# Patient Record
Sex: FEMALE | Race: WHITE | NOT HISPANIC OR LATINO | Employment: FULL TIME | URBAN - METROPOLITAN AREA
[De-identification: names, ages, dates, MRNs, and addresses within clinical notes are randomized per-mention and may not be internally consistent; named-entity substitution may affect disease eponyms.]

---

## 2017-07-08 ENCOUNTER — TELEPHONE (OUTPATIENT)
Dept: URGENT CARE | Facility: CLINIC | Age: 32
End: 2017-07-08

## 2021-04-14 ENCOUNTER — OFFICE VISIT (OUTPATIENT)
Dept: INTERNAL MEDICINE | Facility: CLINIC | Age: 36
End: 2021-04-14

## 2021-04-14 ENCOUNTER — LAB (OUTPATIENT)
Dept: LAB | Facility: HOSPITAL | Age: 36
End: 2021-04-14

## 2021-04-14 VITALS
OXYGEN SATURATION: 99 % | SYSTOLIC BLOOD PRESSURE: 132 MMHG | HEIGHT: 65 IN | WEIGHT: 131.6 LBS | HEART RATE: 84 BPM | TEMPERATURE: 98 F | DIASTOLIC BLOOD PRESSURE: 62 MMHG | BODY MASS INDEX: 21.92 KG/M2 | RESPIRATION RATE: 16 BRPM

## 2021-04-14 DIAGNOSIS — Z00.00 HEALTHCARE MAINTENANCE: ICD-10-CM

## 2021-04-14 DIAGNOSIS — Z13.29 SCREENING FOR THYROID DISORDER: ICD-10-CM

## 2021-04-14 DIAGNOSIS — Z13.21 ENCOUNTER FOR VITAMIN DEFICIENCY SCREENING: ICD-10-CM

## 2021-04-14 DIAGNOSIS — Z87.898 HISTORY OF SUBSTANCE USE DISORDER: ICD-10-CM

## 2021-04-14 DIAGNOSIS — Z13.220 SCREENING, LIPID: ICD-10-CM

## 2021-04-14 DIAGNOSIS — F41.9 ANXIETY: Primary | ICD-10-CM

## 2021-04-14 DIAGNOSIS — G43.109 COMPLICATED MIGRAINE: ICD-10-CM

## 2021-04-14 LAB
25(OH)D3 SERPL-MCNC: 31.6 NG/ML
ALBUMIN SERPL-MCNC: 4.9 G/DL (ref 3.5–5.2)
ALBUMIN/GLOB SERPL: 2 G/DL
ALP SERPL-CCNC: 49 U/L (ref 39–117)
ALT SERPL W P-5'-P-CCNC: 22 U/L (ref 1–33)
ANION GAP SERPL CALCULATED.3IONS-SCNC: 9.2 MMOL/L (ref 5–15)
AST SERPL-CCNC: 11 U/L (ref 1–32)
BILIRUB SERPL-MCNC: 0.6 MG/DL (ref 0–1.2)
BUN SERPL-MCNC: 11 MG/DL (ref 6–20)
BUN/CREAT SERPL: 14.5 (ref 7–25)
CALCIUM SPEC-SCNC: 9.6 MG/DL (ref 8.6–10.5)
CHLORIDE SERPL-SCNC: 103 MMOL/L (ref 98–107)
CHOLEST SERPL-MCNC: 155 MG/DL (ref 0–200)
CO2 SERPL-SCNC: 28.8 MMOL/L (ref 22–29)
CREAT SERPL-MCNC: 0.76 MG/DL (ref 0.57–1)
DEPRECATED RDW RBC AUTO: 43.2 FL (ref 37–54)
ERYTHROCYTE [DISTWIDTH] IN BLOOD BY AUTOMATED COUNT: 11.7 % (ref 12.3–15.4)
GFR SERPL CREATININE-BSD FRML MDRD: 87 ML/MIN/1.73
GLOBULIN UR ELPH-MCNC: 2.5 GM/DL
GLUCOSE SERPL-MCNC: 72 MG/DL (ref 65–99)
HCT VFR BLD AUTO: 44.4 % (ref 34–46.6)
HDLC SERPL-MCNC: 61 MG/DL (ref 40–60)
HGB BLD-MCNC: 14.5 G/DL (ref 12–15.9)
LDLC SERPL CALC-MCNC: 76 MG/DL (ref 0–100)
LDLC/HDLC SERPL: 1.22 {RATIO}
MCH RBC QN AUTO: 32.9 PG (ref 26.6–33)
MCHC RBC AUTO-ENTMCNC: 32.7 G/DL (ref 31.5–35.7)
MCV RBC AUTO: 100.7 FL (ref 79–97)
PLATELET # BLD AUTO: 384 10*3/MM3 (ref 140–450)
PMV BLD AUTO: 10.9 FL (ref 6–12)
POTASSIUM SERPL-SCNC: 4.5 MMOL/L (ref 3.5–5.2)
PROT SERPL-MCNC: 7.4 G/DL (ref 6–8.5)
RBC # BLD AUTO: 4.41 10*6/MM3 (ref 3.77–5.28)
SODIUM SERPL-SCNC: 141 MMOL/L (ref 136–145)
TRIGL SERPL-MCNC: 97 MG/DL (ref 0–150)
TSH SERPL DL<=0.05 MIU/L-ACNC: 0.94 UIU/ML (ref 0.27–4.2)
VLDLC SERPL-MCNC: 18 MG/DL (ref 5–40)
WBC # BLD AUTO: 6.25 10*3/MM3 (ref 3.4–10.8)

## 2021-04-14 PROCEDURE — 99204 OFFICE O/P NEW MOD 45 MIN: CPT | Performed by: INTERNAL MEDICINE

## 2021-04-14 PROCEDURE — 82306 VITAMIN D 25 HYDROXY: CPT

## 2021-04-14 PROCEDURE — 80061 LIPID PANEL: CPT

## 2021-04-14 PROCEDURE — 85027 COMPLETE CBC AUTOMATED: CPT

## 2021-04-14 PROCEDURE — 84443 ASSAY THYROID STIM HORMONE: CPT

## 2021-04-14 PROCEDURE — 80053 COMPREHEN METABOLIC PANEL: CPT

## 2021-04-14 RX ORDER — HYDROXYZINE HYDROCHLORIDE 10 MG/1
10 TABLET, FILM COATED ORAL 3 TIMES DAILY PRN
COMMUNITY

## 2021-04-14 RX ORDER — BUSPIRONE HYDROCHLORIDE 5 MG/1
5 TABLET ORAL 3 TIMES DAILY
COMMUNITY

## 2021-04-14 NOTE — PROGRESS NOTES
"Internal Medicine New Patient  Dee Dee Ahn is a 35 y.o. female who presents today to establish care and with concerns as outlined below.    Chief Complaint  Chief Complaint   Patient presents with   • Establish Care     New Pt        HPI  Ms. Ahn comes in today to establish care. She has not had a PCP for a few years, last saw [facility]. She is seeing [facility] for anxiety and depression. She is not seeing a counselor. She has been prescribed buspar 5mg TID and hydroxyzine 10mg TID PRN but she notes that she does not like taking medication so has not started taking it. She feels that if she gets her anxiety under control she may be able to also manage her depression. She has a history of complicated migraine with loss of sensation on half of her body, vision changes, and \"out of body sensation.\" She came off of OCP when complicated migraines started to occur. She notes these occur rarely and has deferred medications. She has a past hx of DILLON with primary drug of abuse being oxycontin. She notes sobriety x13y. She moved to [place] from [place] to get a fresh start and find a community to support sobriety. She reports her fear of medications stems from her past drug abuse.       Review of Systems  Review of Systems   Constitutional: Negative.    Eyes: Negative.    Respiratory: Negative.    Cardiovascular: Negative.    Gastrointestinal: Positive for diarrhea (occasional with increased anxiety). Negative for abdominal pain, anal bleeding, blood in stool, nausea, vomiting, GERD and indigestion.   Genitourinary: Positive for menstrual problem (PMS).   Musculoskeletal: Negative.    Skin: Negative.    Neurological: Positive for headache (infrequent migraine).   Psychiatric/Behavioral: Positive for dysphoric mood and sleep disturbance. Negative for depressed mood. The patient is nervous/anxious.         Past Medical History  Past Medical History:   Diagnosis Date   • Anxiety    • Depression  " "  • Migraine         Surgical History  History reviewed. No pertinent surgical history.     Family History  Family History   Problem Relation Age of Onset   • Cancer Father    • Heart attack Father    • Depression Maternal Grandmother    • Depression Maternal Grandfather    • Depression Paternal Grandmother    • Depression Paternal Grandfather         Social History  Social History     Socioeconomic History   • Marital status: Unknown     Spouse name: Not on file   • Number of children: Not on file   • Years of education: Not on file   • Highest education level: Not on file   Tobacco Use   • Smoking status: Never Smoker   • Smokeless tobacco: Never Used   • Tobacco comment: Nicoret    Vaping Use   • Vaping Use: Never used   Substance and Sexual Activity   • Alcohol use: No     Comment: Recovery 12 years   • Drug use: No     Comment: recovering addict   • Sexual activity: Defer        Current Medications  Current Outpatient Medications on File Prior to Visit   Medication Sig Dispense Refill   • busPIRone (BUSPAR) 5 MG tablet Take 5 mg by mouth 3 (Three) Times a Day.       No current facility-administered medications on file prior to visit.       Allergies  Allergies   Allergen Reactions   • Eggs Or Egg-Derived Products GI Intolerance        Objective  Visit Vitals  /62   Pulse 84   Temp 98 °F (36.7 °C)   Resp 16   Ht 165 cm (64.96\")   Wt 59.7 kg (131 lb 9.6 oz)   SpO2 99%   BMI 21.93 kg/m²        Physical Exam  Physical Exam  Vitals and nursing note reviewed.   Constitutional:       General: She is not in acute distress.     Appearance: Normal appearance. She is well-developed and normal weight. She is not ill-appearing or diaphoretic.   HENT:      Head: Normocephalic and atraumatic.      Right Ear: Tympanic membrane, ear canal and external ear normal.      Left Ear: Tympanic membrane, ear canal and external ear normal.      Nose: Nose normal.   Eyes:      General: No scleral icterus.     Extraocular Movements: " Extraocular movements intact.      Conjunctiva/sclera: Conjunctivae normal.   Cardiovascular:      Rate and Rhythm: Normal rate and regular rhythm.      Heart sounds: Normal heart sounds. No murmur heard.     Pulmonary:      Effort: Pulmonary effort is normal. No respiratory distress.      Breath sounds: Normal breath sounds.   Abdominal:      General: Bowel sounds are normal. There is no distension.      Palpations: Abdomen is soft. There is no mass.      Tenderness: There is no abdominal tenderness.   Musculoskeletal:         General: No deformity.      Cervical back: Neck supple.      Right lower leg: No edema.      Left lower leg: No edema.   Skin:     General: Skin is warm and dry.      Findings: No rash.   Neurological:      General: No focal deficit present.      Mental Status: She is alert and oriented to person, place, and time.      Gait: Gait normal.   Psychiatric:         Mood and Affect: Mood is anxious.         Behavior: Behavior normal.         Thought Content: Thought content normal.         Judgment: Judgment normal.          Results  No results associated with this encounter.     Assessment and Plan  Diagnoses and all orders for this visit:    Anxiety  - Uncontrolled. Following with [facility].  - Prescribed buspar 5mg TID and hydroxyzine 10mg TID PRN but has not taken either. Discussed that neither is habit forming or addicting and discussed potential side effects of buspar. Encouraged her to try taking buspar and continue following with psychiatrist.    Complicated migraine  - Hx of migraine associated with hemisensory loss, vision changes.  - Infrequent and declines medications which is reasonable given infrequent occurrence.  - Given hx of complicated migraine discussed that a combined OCP would be contraindicated and recommended GYN to discuss alternative.    History of substance use disorder  - Sober x13y, previous opiate abuse.    Healthcare maintenance  - CBC and CMP  ordered    Screening for thyroid disorder  - TSH ordered    Screening, lipid  - Lipid panel ordered    Encounter for vitamin deficiency screening  - Vitamin D level ordered      Health Maintenance   Topic Date Due   • ANNUAL PHYSICAL  Never done   • COVID-19 Vaccine (1) Never done   • TDAP/TD VACCINES (1 - Tdap) Never done   • HEPATITIS C SCREENING  Never done   • PAP SMEAR  Never done   • INFLUENZA VACCINE  08/01/2021   • Pneumococcal Vaccine 0-64  Aged Out     Health Maintenance  - Pap smear: GYN [facility], has not been seen recently but will schedule. Declines referral.  - Mammogram: Start screening at age 40.  - Colonoscopy: Start screening at age 45.  - HCV: negative  - Immunizations: Needs COVID, tdap.  - Depression screening: negative 4/2021    Return in about 1 year (around 4/14/2022) for Annual, Labs today.

## 2023-04-19 ENCOUNTER — OFFICE VISIT (OUTPATIENT)
Dept: FAMILY MEDICINE CLINIC | Facility: CLINIC | Age: 38
End: 2023-04-19
Payer: COMMERCIAL

## 2023-04-19 ENCOUNTER — LAB (OUTPATIENT)
Dept: LAB | Facility: HOSPITAL | Age: 38
End: 2023-04-19
Payer: COMMERCIAL

## 2023-04-19 VITALS
HEIGHT: 67 IN | OXYGEN SATURATION: 98 % | SYSTOLIC BLOOD PRESSURE: 108 MMHG | BODY MASS INDEX: 23.2 KG/M2 | HEART RATE: 88 BPM | WEIGHT: 147.8 LBS | TEMPERATURE: 97.8 F | DIASTOLIC BLOOD PRESSURE: 62 MMHG

## 2023-04-19 DIAGNOSIS — Z13.29 SCREENING FOR ENDOCRINE DISORDER: ICD-10-CM

## 2023-04-19 DIAGNOSIS — E07.89 THYROID FULLNESS: ICD-10-CM

## 2023-04-19 DIAGNOSIS — Z13.0 SCREENING FOR DEFICIENCY ANEMIA: ICD-10-CM

## 2023-04-19 DIAGNOSIS — F41.9 ANXIETY: Primary | ICD-10-CM

## 2023-04-19 DIAGNOSIS — Z13.220 SCREENING FOR HYPERLIPIDEMIA: ICD-10-CM

## 2023-04-19 LAB
ALBUMIN SERPL-MCNC: 5.1 G/DL (ref 3.5–5.2)
ALBUMIN/GLOB SERPL: 2.1 G/DL
ALP SERPL-CCNC: 60 U/L (ref 39–117)
ALT SERPL W P-5'-P-CCNC: 14 U/L (ref 1–33)
ANION GAP SERPL CALCULATED.3IONS-SCNC: 11.6 MMOL/L (ref 5–15)
AST SERPL-CCNC: 9 U/L (ref 1–32)
BACTERIA UR QL AUTO: ABNORMAL /HPF
BILIRUB SERPL-MCNC: 0.6 MG/DL (ref 0–1.2)
BILIRUB UR QL STRIP: NEGATIVE
BUN SERPL-MCNC: 7 MG/DL (ref 6–20)
BUN/CREAT SERPL: 8 (ref 7–25)
CALCIUM SPEC-SCNC: 9.7 MG/DL (ref 8.6–10.5)
CHLORIDE SERPL-SCNC: 102 MMOL/L (ref 98–107)
CHOLEST SERPL-MCNC: 160 MG/DL (ref 0–200)
CLARITY UR: CLEAR
CO2 SERPL-SCNC: 25.4 MMOL/L (ref 22–29)
COLOR UR: YELLOW
CREAT SERPL-MCNC: 0.87 MG/DL (ref 0.57–1)
DEPRECATED RDW RBC AUTO: 40.2 FL (ref 37–54)
EGFRCR SERPLBLD CKD-EPI 2021: 88.1 ML/MIN/1.73
ERYTHROCYTE [DISTWIDTH] IN BLOOD BY AUTOMATED COUNT: 11.4 % (ref 12.3–15.4)
GLOBULIN UR ELPH-MCNC: 2.4 GM/DL
GLUCOSE SERPL-MCNC: 91 MG/DL (ref 65–99)
GLUCOSE UR STRIP-MCNC: NEGATIVE MG/DL
HBA1C MFR BLD: 5.3 % (ref 4.8–5.6)
HCT VFR BLD AUTO: 43.3 % (ref 34–46.6)
HDLC SERPL-MCNC: 58 MG/DL (ref 40–60)
HGB BLD-MCNC: 14.8 G/DL (ref 12–15.9)
HGB UR QL STRIP.AUTO: NEGATIVE
HYALINE CASTS UR QL AUTO: ABNORMAL /LPF
KETONES UR QL STRIP: ABNORMAL
LDLC SERPL CALC-MCNC: 91 MG/DL (ref 0–100)
LDLC/HDLC SERPL: 1.58 {RATIO}
LEUKOCYTE ESTERASE UR QL STRIP.AUTO: ABNORMAL
MCH RBC QN AUTO: 32.9 PG (ref 26.6–33)
MCHC RBC AUTO-ENTMCNC: 34.2 G/DL (ref 31.5–35.7)
MCV RBC AUTO: 96.2 FL (ref 79–97)
NITRITE UR QL STRIP: NEGATIVE
PH UR STRIP.AUTO: 6 [PH] (ref 5–8)
PLATELET # BLD AUTO: 406 10*3/MM3 (ref 140–450)
PMV BLD AUTO: 10.4 FL (ref 6–12)
POTASSIUM SERPL-SCNC: 4.4 MMOL/L (ref 3.5–5.2)
PROT SERPL-MCNC: 7.5 G/DL (ref 6–8.5)
PROT UR QL STRIP: NEGATIVE
RBC # BLD AUTO: 4.5 10*6/MM3 (ref 3.77–5.28)
RBC # UR STRIP: ABNORMAL /HPF
REF LAB TEST METHOD: ABNORMAL
SODIUM SERPL-SCNC: 139 MMOL/L (ref 136–145)
SP GR UR STRIP: 1.01 (ref 1–1.03)
SQUAMOUS #/AREA URNS HPF: ABNORMAL /HPF
TRIGL SERPL-MCNC: 53 MG/DL (ref 0–150)
TSH SERPL DL<=0.05 MIU/L-ACNC: 1.34 UIU/ML (ref 0.27–4.2)
UROBILINOGEN UR QL STRIP: ABNORMAL
VLDLC SERPL-MCNC: 11 MG/DL (ref 5–40)
WBC # UR STRIP: ABNORMAL /HPF
WBC NRBC COR # BLD: 5.79 10*3/MM3 (ref 3.4–10.8)

## 2023-04-19 PROCEDURE — 83036 HEMOGLOBIN GLYCOSYLATED A1C: CPT

## 2023-04-19 PROCEDURE — 80061 LIPID PANEL: CPT

## 2023-04-19 PROCEDURE — 81001 URINALYSIS AUTO W/SCOPE: CPT

## 2023-04-19 PROCEDURE — 80050 GENERAL HEALTH PANEL: CPT

## 2023-04-19 RX ORDER — DESVENLAFAXINE 25 MG/1
TABLET, EXTENDED RELEASE ORAL
COMMUNITY
Start: 2023-04-04

## 2023-04-19 NOTE — PROGRESS NOTES
"New Patient Office Visit      Patient Name: Dee Dee Ahn  : 1985   MRN: [id number]     Chief Complaint:    Chief Complaint   Patient presents with   • Anxiety     New pt co increased anxiety       Subjective   History of Present Illness    History of Present Illness: Dee Dee Ahn is a 37 y.o. female who is here today to establish care.    Previous primary care: She saw Shazia Rizzo once in 2021 to establish care, has not seen her since.  At that point she had not had a PCP in a few years, most recently prior had seen [facility].    Chronic health conditions:  History of complicated migraine with sensation changes in half her body, vision changes, and \"out of body sensation.\"  She had previously been on OCP but came off of these when her complicated migraines started to occur.  They are exceedingly rare and she has deferred medications up to this point.  History of substance use disorder, primary drug abuse being OxyContin.  Has been sober for going on 15 years.  As a result of previous drug abuse, she does have an ongoing hesitancy about medications.  Anxiety: Has been prescribed medication in the past but does not take apart from hydroxyzine. She does see a psychiatry NP [facility]. She was recently prescribed Pristiq, plans to start on Saturday. Stressors include: work, domestic life. Has appt with a therapist scheduled.    Other physicians currently involved in patient's care:  Patient Care Team:  Naresh Sanz DO as PCP - General (Family Medicine)    Acute Concerns:  This patient is accompanied by their self who contributes to the history of their care.    The following portions of the patient's history were reviewed and updated as appropriate: allergies, current medications, past family history, past medical history, past social history, past surgical history and problem list.    Subjective      Review of Systems:   Review of Systems - See HPI and " new patient paperwork scanned into chart    Past Medical History:   Past Medical History:   Diagnosis Date   • Anxiety    • Depression    • Irritable bowel syndrome    • Migraine        Past Surgical History: No past surgical history on file.    Family History:   Family History   Problem Relation Age of Onset   • Heart attack Father    • Prostate cancer Father    • Cancer Father    • Depression Maternal Grandmother    • Depression Maternal Grandfather    • Alcohol abuse Maternal Grandfather    • Depression Paternal Grandmother    • Drug abuse Paternal Grandmother    • Depression Paternal Grandfather    • Colon cancer Paternal Grandfather    • Cancer Paternal Grandfather    • Arthritis Mother    • Osteoporosis Mother    • Hypertension Brother    • Alcohol abuse Brother        Social History:   Social History     Socioeconomic History   • Marital status: Unknown   Tobacco Use   • Smoking status: Former     Packs/day: 1.00     Years: 10.00     Pack years: 10.00     Types: Cigarettes     Start date: 2001     Quit date: 2015     Years since quittin.3   • Smokeless tobacco: Never   Vaping Use   • Vaping Use: Never used   Substance and Sexual Activity   • Alcohol use: No     Comment: sober 12 years   • Drug use: Not Currently     Types: Oxycodone     Comment: 13y clean   • Sexual activity: Not Currently     Partners: Male     Birth control/protection: Abstinence       Tobacco History:   Social History     Tobacco Use   Smoking Status Former   • Packs/day: 1.00   • Years: 10.00   • Pack years: 10.00   • Types: Cigarettes   • Start date: 2001   • Quit date: 2015   • Years since quittin.3   Smokeless Tobacco Never       Medications:     Current Outpatient Medications:   •  hydrOXYzine (ATARAX) 10 MG tablet, Take 1 tablet by mouth 3 (Three) Times a Day As Needed., Disp: , Rfl:   •  Desvenlafaxine Succinate ER 25 MG tablet sustained-release 24 hour, , Disp: , Rfl:     Allergies:   Allergies   Allergen  "Reactions   • Eggs Or Egg-Derived Products GI Intolerance       Objective   Objective     Physical Exam:  Vital Signs:   Vitals:    04/19/23 0903   BP: 108/62   BP Location: Left arm   Patient Position: Sitting   Cuff Size: Adult   Pulse: 88   Temp: 97.8 °F (36.6 °C)   TempSrc: Infrared   SpO2: 98%   Weight: 67 kg (147 lb 12.8 oz)   Height: 170.2 cm (67\")     Body mass index is 23.15 kg/m².     Physical Exam  Nursing note reviewed  Const: NAD, A&Ox4, Pleasant, Cooperative  Eyes: EOMI, no conjunctivitis  ENT: No nasal discharge present, neck supple  Cardiac: Regular rate and rhythm, no cyanosis  Resp: Respiratory rate within normal limits, no increased work of breathing, no audible wheezing or retractions noted  GI: No distention or ascites  MSK: Motor and sensation grossly intact in bilateral upper extremities  Neurologic: CN II-XII grossly intact  Psych: Appropriate mood and behavior.  Skin: Warm, dry  Procedures/Radiology     Procedures  No radiology results for the last 7 days     Assessment & Plan   Assessment / Plan      Assessment/Plan:   Problems Addressed This Visit  Diagnoses and all orders for this visit:    1. Anxiety (Primary)    2. Thyroid fullness  -     TSH; Future    3. Screening for deficiency anemia  -     CBC (No Diff); Future    4. Screening for endocrine disorder  -     Hemoglobin A1c; Future  -     Comprehensive Metabolic Panel; Future  -     Urinalysis With Microscopic If Indicated (No Culture) - Urine, Clean Catch; Future    5. Screening for hyperlipidemia  -     Lipid Panel; Future      Problem List Items Addressed This Visit        Mental Health    Anxiety - Primary   Other Visit Diagnoses     Thyroid fullness        Relevant Orders    TSH (Completed)    Screening for deficiency anemia        Relevant Orders    CBC (No Diff) (Completed)    Screening for endocrine disorder        Relevant Orders    Hemoglobin A1c (Completed)    Comprehensive Metabolic Panel (Completed)    Urinalysis With " Microscopic If Indicated (No Culture) - Urine, Clean Catch (Completed)    Screening for hyperlipidemia        Relevant Orders    Lipid Panel (Completed)          We will plan to obtain previous records both for chronic preventative care as well as those related to the current episode of care.  Any records that the patient brought with her today were reviewed personally by me during the visit today and will be scanned into the chart for posterity.    Discussed the nature of the disease including relevant anatomy & expected clinical course, risks, complications, implications, management, safe and proper use of medications. Plan of care reviewed with patient at the conclusion of today's visit. Education was provided regarding diagnosis and management.  Patient verbalizes understanding of and agreement with management plan. Encouraged therapeutic lifestyle changes including low calorie diet with plenty of fruits and vegetables, daily exercise, medication compliance, and keeping scheduled follow up appointments with me and any other providers. Encouraged patient to have appointment for complete physical, fasting labs, appropriate screenings, and immunizations on an annual basis. Discussed extended office hours, shared call, and appropriate use of the ER. Discussed generally we do not prescribe chronic controlled substances from this office. Appropriate referrals will be made to pain management and psychiatry if needed. Stressed the importance and expectation of medical compliance with plan of care, medications, and follow up appointments.    Patient Instructions   A referral has been placed for you today.  You will be contacted by our referral coordinator or the specialist's office to schedule your appointment over the next 2 to 3 business days.  Please make sure to check your voicemail, and ensure that we have the correct phone number on file for you.  Sometimes, based on insurance requirements, referrals may take  longer to schedule. However if you have not heard from anyone within 7 days, please call the office to check on your referral status.       Follow Up:   Return in about 6 months (around 10/19/2023).    DO SADA Tucker RD  [facility]  [id number]  [facility] [address]  Fax [phone]  Phone [phone]

## 2025-06-02 ENCOUNTER — TELEPHONE (OUTPATIENT)
Dept: FAMILY MEDICINE CLINIC | Facility: CLINIC | Age: 40
End: 2025-06-02
Payer: COMMERCIAL

## 2025-06-02 NOTE — TELEPHONE ENCOUNTER
Caller: Dee Dee Ahn    Relationship to patient: Self    Best call back number:     Chief complaint: PHYSICAL    Type of visit: PHYSICAL    Requested date: ANY DAY TIME     If rescheduling, when is the original appointment: PATIENT IS SCHEDULED WED [id number]     Additional notes:DR ESQUIVEL HAD NO OPENINGS UNTIL AUGUST 2025

## 2025-06-04 ENCOUNTER — OFFICE VISIT (OUTPATIENT)
Dept: FAMILY MEDICINE CLINIC | Facility: CLINIC | Age: 40
End: 2025-06-04
Payer: COMMERCIAL

## 2025-06-04 VITALS
WEIGHT: 163 LBS | OXYGEN SATURATION: 97 % | BODY MASS INDEX: 27.16 KG/M2 | HEART RATE: 68 BPM | DIASTOLIC BLOOD PRESSURE: 66 MMHG | SYSTOLIC BLOOD PRESSURE: 112 MMHG | HEIGHT: 65 IN

## 2025-06-04 DIAGNOSIS — Z00.00 ANNUAL PHYSICAL EXAM: Primary | ICD-10-CM

## 2025-06-04 DIAGNOSIS — R63.5 WEIGHT GAIN: ICD-10-CM

## 2025-06-04 DIAGNOSIS — F41.9 ANXIETY: ICD-10-CM

## 2025-06-04 PROCEDURE — 99395 PREV VISIT EST AGE 18-39: CPT

## 2025-06-04 RX ORDER — SERTRALINE HYDROCHLORIDE 25 MG/1
TABLET, FILM COATED ORAL
COMMUNITY
Start: 2025-04-26

## 2025-06-04 NOTE — PROGRESS NOTES
Female Physical Note      Date: 2025   Patient Name: Dee Dee Ahn  : 1985   MRN: [id number]     Chief Complaint:    Chief Complaint   Patient presents with    Annual Exam       History of Present Illness: Dee Dee Ahn is a 39 y.o. female who is here today for their annual health maintenance and physical. PMHx of anxiety.     Is with behavioral health for anxiety.  Has been on sertraline for 2 years.  Takes hydroxyzine as needed.    [facility] Reports UTD on pap smear-pt reports it was within normal limits. Had it done last March. Has an IUD for birth control.  States she has an appointment in August for her updated Pap.  Reports menstrual cycles have been regular.    Reports she has had weight gain over the past year.  Reports that she notes she has been trying to stay on a calorie deficit and even about 1500 calories per day. Not wearing an apple watch or calorie tracker to see how much she burns.  States this is the most she has ever weighed.  Reports that for breakfast she eats oats and for lunch she will eat a sandwich.  Drinks mostly water.    Sees dentist regularly.   Encouraged eye exam.     Father- CAD and prostate cancer.   Paternal father- colon cancer.   No breast cancer in FH.       Subjective      Review of Systems:   Review of Systems   Constitutional:  Positive for unexpected weight gain. Negative for chills and fever.   Respiratory:  Negative for chest tightness and shortness of breath.    Cardiovascular:  Negative for chest pain and palpitations.   Gastrointestinal:  Negative for abdominal pain.   Neurological:  Negative for dizziness and light-headedness.       Past Medical History, Social History, Family History and Care Team were all reviewed with patient and updated as appropriate.     Medications:     Current Outpatient Medications:     hydrOXYzine (ATARAX) 10 MG tablet, Take 1 tablet by mouth 3 (Three) Times a Day As Needed for Anxiety. Vistaril, Disp:  ", Rfl:     sertraline (ZOLOFT) 25 MG tablet, , Disp: , Rfl:     Allergies:   Allergies   Allergen Reactions    Egg-Derived Products GI Intolerance       Immunizations:  Td/Tdap(Booster Q 10 yrs): Recommended.  Flu (Yearly): Recommended.  COVID: Recommended.  Pap: Patient reports up-to-date.  Advised to have records sent to our office.  Last Completed Pap Smear    This patient has no relevant Health Maintenance data.          Hep C ( 5427-9554): Ordered.    Diet/Physical activity:  Walks 30 minutes/day.  Tries to eat around 1500 gm/day.  Has been doing this for 1 month.    Sexual Health:    Uses IUD for birth control.   Defers STI screen.    PHQ-2 Depression Screening  Little interest or pleasure in doing things? Not at all   Feeling down, depressed, or hopeless? Not at all   PHQ-2 Total Score 0           Intimate partner violence: (Screen on initial visit, pregnant women, women with injuries, older adult with injury or evidence of neglect):  Violence can be a problem in many people's lives, so I now ask every patient about trauma or abuse they may have experienced in a relationship.  Stress/Safety - Do you feel safe in your relationship?  Afraid/Abused - Have you ever been in a relationship where you were threatened, hurt, or afraid?  Friend/Family - Are your friends aware you have been hurt?  Emergency Plan - Do you have a safe place to go and the resources you need in an emergency?    Osteoporosis:   Ost menopausal women < 65 with RF (advancing age, previous fracture, glucocorticoid therapy, parental hip fracture, low body weight, current cigarette smoking, excessive alcohol consumption, rheumatoid arthritis, secondary osteoporosis [hypogonadism/premature menopause, malabsorption, chronic liver disease, IBD]).  All women 65 or older    Objective     Physical Exam:  Vital Signs:   Vitals:    25 1238   BP: 112/66   Pulse: 68   SpO2: 97%   Weight: 73.9 kg (163 lb)   Height: 165.1 cm (65\")        "     Physical Exam  Vitals reviewed.   Constitutional:       Appearance: Normal appearance. She is normal weight.   HENT:      Head: Normocephalic and atraumatic.   Eyes:      Pupils: Pupils are equal, round, and reactive to light.   Cardiovascular:      Rate and Rhythm: Normal rate and regular rhythm.      Pulses: Normal pulses.      Heart sounds: Normal heart sounds.   Pulmonary:      Effort: Pulmonary effort is normal.      Breath sounds: Normal breath sounds.   Abdominal:      General: Abdomen is flat. Bowel sounds are normal.   Skin:     General: Skin is warm.   Neurological:      General: No focal deficit present.      Mental Status: She is alert and oriented to person, place, and time.   Psychiatric:         Mood and Affect: Mood normal.           Assessment / Plan      Assessment/Plan:   Diagnoses and all orders for this visit:    1. Annual physical exam (Primary)  -     CBC (No Diff); Future  -     Lipid Panel; Future  -     Hemoglobin A1c; Future  -     Comprehensive Metabolic Panel; Future  -     TSH Rfx On Abnormal To Free T4; Future  -     Vitamin B12; Future  -     Folate; Future  -     Vitamin D,25-Hydroxy; Future  -     Hepatitis C Antibody; Future    2. Anxiety    3. Weight gain  -     Lipid Panel; Future  -     TSH Rfx On Abnormal To Free T4; Future    Annual labs ordered.  Encouraged dental exam every 6 months and eye exam annually.  Up-to-date on preventative cancer screenings.  Requested records for Pap smear.  Counseled on recommended vaccinations, but patient declines today.  Counseled on mammogram screening starting at 40 years old.  Recommended monthly self breast exams.    Counseled patient that the amount of calories should be taking in depends on how many she has burning per day.  Recommend trial of an Apple Watch or some sort of calorie tracker to help guide her in home which she should be intaking per day.  Patient's diet seems to be carbohydrate heavy.  Counseled on increasing protein  in her diet.  Work on increasing aerobic exercise and getting her heart rate up. Discussed options including meeting with a nutritionist.  Will evaluate blood work first to make sure there is no thyroid impairment.  She also is on sertraline for anxiety which seems to be managing her symptoms well, but can cause weight gain.  If labs normal and still not having success with weight loss recommend discussing other options with your psychiatrist for medication.  Nutrition and activity goals reviewed including: mainly water to drink, limit white flour/processed sugar, and processed foods, choose fresh fruits, vegetables, fish, lean meats,high fiber carbs, exercise  working toward 150 mins cardio per week, weight training 2x/week.  Patient was encouraged to keep me informed of any acute changes, lack of improvement, or any new concerning symptoms.     Plan of care reviewed with the patient at the conclusion of today's visit.  Education was provided regarding diagnosis and management.  Patient verbalizes understanding of and agreement with plan.     Dictated Utilizing Dragon Dictation     Please note that portions of this note were completed with a voice recognition program.     Part of this note may be an electronic transcription/translation of spoken language to printed text using the Dragon Dictation System.     Follow Up:   Return in 1 year (on 6/4/2026), or if symptoms worsen or fail to improve, for Annual physical.    Healthcare Maintenance:   Counseling provided on preventative cancer screenings, dietary recommendations and vaccinations.   Dee Dee Ahn voices understanding and acceptance of this advice and will call back with any further questions or concerns. AVS with preventive healthcare tips printed for patient.     Honey Woods PA-C   [facility] [address]

## 2025-06-24 ENCOUNTER — LAB (OUTPATIENT)
Dept: LAB | Facility: HOSPITAL | Age: 40
End: 2025-06-24
Payer: COMMERCIAL

## 2025-06-24 DIAGNOSIS — R63.5 WEIGHT GAIN: ICD-10-CM

## 2025-06-24 DIAGNOSIS — Z00.00 ANNUAL PHYSICAL EXAM: ICD-10-CM

## 2025-06-24 LAB
25(OH)D3 SERPL-MCNC: 25.3 NG/ML (ref 30–100)
ALBUMIN SERPL-MCNC: 4.5 G/DL (ref 3.5–5.2)
ALBUMIN/GLOB SERPL: 1.5 G/DL
ALP SERPL-CCNC: 51 U/L (ref 39–117)
ALT SERPL W P-5'-P-CCNC: 14 U/L (ref 1–33)
ANION GAP SERPL CALCULATED.3IONS-SCNC: 15 MMOL/L (ref 5–15)
AST SERPL-CCNC: 11 U/L (ref 1–32)
BILIRUB SERPL-MCNC: 0.5 MG/DL (ref 0–1.2)
BUN SERPL-MCNC: 9 MG/DL (ref 6–20)
BUN/CREAT SERPL: 11.4 (ref 7–25)
CALCIUM SPEC-SCNC: 9.6 MG/DL (ref 8.6–10.5)
CHLORIDE SERPL-SCNC: 101 MMOL/L (ref 98–107)
CHOLEST SERPL-MCNC: 160 MG/DL (ref 0–200)
CO2 SERPL-SCNC: 23 MMOL/L (ref 22–29)
CREAT SERPL-MCNC: 0.79 MG/DL (ref 0.57–1)
DEPRECATED RDW RBC AUTO: 41.2 FL (ref 37–54)
EGFRCR SERPLBLD CKD-EPI 2021: 97.7 ML/MIN/1.73
ERYTHROCYTE [DISTWIDTH] IN BLOOD BY AUTOMATED COUNT: 11.6 % (ref 12.3–15.4)
FOLATE SERPL-MCNC: 15.5 NG/ML (ref 4.78–24.2)
GLOBULIN UR ELPH-MCNC: 3.1 GM/DL
GLUCOSE SERPL-MCNC: 76 MG/DL (ref 65–99)
HBA1C MFR BLD: 5 % (ref 4.8–5.6)
HCT VFR BLD AUTO: 41.9 % (ref 34–46.6)
HCV AB SER QL: NORMAL
HDLC SERPL-MCNC: 52 MG/DL (ref 40–60)
HGB BLD-MCNC: 14.1 G/DL (ref 12–15.9)
LDLC SERPL CALC-MCNC: 94 MG/DL (ref 0–100)
LDLC/HDLC SERPL: 1.8 {RATIO}
MCH RBC QN AUTO: 33.3 PG (ref 26.6–33)
MCHC RBC AUTO-ENTMCNC: 33.7 G/DL (ref 31.5–35.7)
MCV RBC AUTO: 98.8 FL (ref 79–97)
PLATELET # BLD AUTO: 378 10*3/MM3 (ref 140–450)
PMV BLD AUTO: 9.8 FL (ref 6–12)
POTASSIUM SERPL-SCNC: 4.3 MMOL/L (ref 3.5–5.2)
PROT SERPL-MCNC: 7.6 G/DL (ref 6–8.5)
RBC # BLD AUTO: 4.24 10*6/MM3 (ref 3.77–5.28)
SODIUM SERPL-SCNC: 139 MMOL/L (ref 136–145)
TRIGL SERPL-MCNC: 72 MG/DL (ref 0–150)
TSH SERPL DL<=0.05 MIU/L-ACNC: 2.25 UIU/ML (ref 0.27–4.2)
VIT B12 BLD-MCNC: 348 PG/ML (ref 211–946)
VLDLC SERPL-MCNC: 14 MG/DL (ref 5–40)
WBC NRBC COR # BLD AUTO: 6.7 10*3/MM3 (ref 3.4–10.8)

## 2025-06-24 PROCEDURE — 83036 HEMOGLOBIN GLYCOSYLATED A1C: CPT

## 2025-06-24 PROCEDURE — 82607 VITAMIN B-12: CPT

## 2025-06-24 PROCEDURE — 86803 HEPATITIS C AB TEST: CPT

## 2025-06-24 PROCEDURE — 82746 ASSAY OF FOLIC ACID SERUM: CPT

## 2025-06-24 PROCEDURE — 82306 VITAMIN D 25 HYDROXY: CPT

## 2025-06-24 PROCEDURE — 80050 GENERAL HEALTH PANEL: CPT

## 2025-06-24 PROCEDURE — 80061 LIPID PANEL: CPT

## 2025-06-25 ENCOUNTER — RESULTS FOLLOW-UP (OUTPATIENT)
Dept: FAMILY MEDICINE CLINIC | Facility: CLINIC | Age: 40
End: 2025-06-25
Payer: COMMERCIAL

## 2025-07-22 ENCOUNTER — OFFICE VISIT (OUTPATIENT)
Dept: FAMILY MEDICINE CLINIC | Facility: CLINIC | Age: 40
End: 2025-07-22
Payer: COMMERCIAL

## 2025-07-22 VITALS
WEIGHT: 163.4 LBS | DIASTOLIC BLOOD PRESSURE: 68 MMHG | HEART RATE: 75 BPM | SYSTOLIC BLOOD PRESSURE: 112 MMHG | OXYGEN SATURATION: 99 % | BODY MASS INDEX: 27.22 KG/M2 | HEIGHT: 65 IN

## 2025-07-22 DIAGNOSIS — F41.9 ANXIETY: Primary | ICD-10-CM

## 2025-07-22 RX ORDER — CHOLECALCIFEROL (VITAMIN D3) 25 MCG
1000 TABLET ORAL DAILY
COMMUNITY

## 2025-07-22 RX ORDER — HYDROXYZINE HYDROCHLORIDE 10 MG/1
10 TABLET, FILM COATED ORAL 2 TIMES DAILY PRN
Qty: 180 TABLET | Refills: 3 | Status: SHIPPED | OUTPATIENT
Start: 2025-07-22

## 2025-07-22 RX ORDER — SERTRALINE HYDROCHLORIDE 25 MG/1
25 TABLET, FILM COATED ORAL DAILY
Qty: 90 TABLET | Refills: 3 | Status: SHIPPED | OUTPATIENT
Start: 2025-07-22